# Patient Record
Sex: MALE
[De-identification: names, ages, dates, MRNs, and addresses within clinical notes are randomized per-mention and may not be internally consistent; named-entity substitution may affect disease eponyms.]

---

## 2018-06-26 ENCOUNTER — HOSPITAL ENCOUNTER (EMERGENCY)
Dept: HOSPITAL 90 - EDH | Age: 38
Discharge: HOME | End: 2018-06-26
Payer: MEDICARE

## 2018-06-26 DIAGNOSIS — S09.8XXA: Primary | ICD-10-CM
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
## 2018-06-26 DIAGNOSIS — I50.9: ICD-10-CM

## 2018-06-26 DIAGNOSIS — Z98.84: ICD-10-CM
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
## 2018-06-26 PROCEDURE — 70450 CT HEAD/BRAIN W/O DYE: CPT

## 2025-02-19 ENCOUNTER — HOSPITAL ENCOUNTER (EMERGENCY)
Dept: HOSPITAL 90 - EDH | Age: 45
Discharge: HOME | End: 2025-02-19
Payer: MEDICARE

## 2025-02-19 VITALS — BODY MASS INDEX: 44.04 KG/M2 | WEIGHT: 274.03 LBS | HEIGHT: 66 IN

## 2025-02-19 VITALS
DIASTOLIC BLOOD PRESSURE: 79 MMHG | RESPIRATION RATE: 20 BRPM | SYSTOLIC BLOOD PRESSURE: 131 MMHG | HEART RATE: 80 BPM | OXYGEN SATURATION: 99 % | TEMPERATURE: 97.9 F

## 2025-02-19 DIAGNOSIS — Z79.02: ICD-10-CM

## 2025-02-19 DIAGNOSIS — Z79.899: ICD-10-CM

## 2025-02-19 DIAGNOSIS — E11.9: ICD-10-CM

## 2025-02-19 DIAGNOSIS — Z79.84: ICD-10-CM

## 2025-02-19 DIAGNOSIS — E78.00: ICD-10-CM

## 2025-02-19 DIAGNOSIS — Z88.0: ICD-10-CM

## 2025-02-19 DIAGNOSIS — I11.0: ICD-10-CM

## 2025-02-19 DIAGNOSIS — Z20.822: ICD-10-CM

## 2025-02-19 DIAGNOSIS — Z79.82: ICD-10-CM

## 2025-02-19 DIAGNOSIS — R50.9: Primary | ICD-10-CM

## 2025-02-19 DIAGNOSIS — E87.5: ICD-10-CM

## 2025-02-19 DIAGNOSIS — E66.01: ICD-10-CM

## 2025-02-19 DIAGNOSIS — I50.9: ICD-10-CM

## 2025-02-19 LAB
APPEARANCE UR: CLEAR
BACTERIA URNS QL MICRO: (no result) /HPF
BASOPHILS # BLD AUTO: 0.04 K/UL (ref 0–0.2)
BASOPHILS NFR BLD AUTO: 0.4 % (ref 0–5)
BILIRUB UR QL STRIP: NEGATIVE MG/DL
BUN SERPL-MCNC: 32 MG/DL (ref 7–18)
CHLORIDE SERPL-SCNC: 104 MMOL/L (ref 101–111)
CO2 SERPL-SCNC: 26 MMOL/L (ref 21–32)
COLOR UR: (no result)
CREAT SERPL-MCNC: 2.2 MG/DL (ref 0.5–1.3)
DEPRECATED SQUAMOUS URNS QL MICRO: (no result) /HPF (ref 0–2)
EOSINOPHIL # BLD AUTO: 0.03 K/UL (ref 0–0.7)
EOSINOPHIL NFR BLD AUTO: 0.3 % (ref 0–8)
ERYTHROCYTE [DISTWIDTH] IN BLOOD BY AUTOMATED COUNT: 13.4 % (ref 11–15.5)
GFR SERPL CREATININE-BSD FRML MDRD: 36 ML/MIN (ref 90–?)
GLUCOSE SERPL-MCNC: 132 MG/DL (ref 70–105)
GLUCOSE UR STRIP-MCNC: >=1000 MG/DL
HCT VFR BLD AUTO: 49.9 % (ref 42–54)
HGB UR QL STRIP: (no result)
HYALINE CASTS URNS QL MICRO: (no result) /LPF
IMM GRANULOCYTES # BLD: 0.03 K/UL (ref 0–1)
KETONES UR STRIP-MCNC: NEGATIVE MG/DL
LEUKOCYTE ESTERASE UR QL STRIP: NEGATIVE LEU/UL
LYMPHOCYTES # SPEC AUTO: 1.3 K/UL (ref 1–4.8)
LYMPHOCYTES NFR SPEC AUTO: 12.1 % (ref 21–51)
MCH RBC QN AUTO: 26 PG (ref 27–33)
MCHC RBC AUTO-ENTMCNC: 30.3 G/DL (ref 32–36)
MCV RBC AUTO: 86 FL (ref 79–99)
MICRO URNS: YES
MONOCYTES # BLD AUTO: 0.8 K/UL (ref 0.1–1)
MONOCYTES NFR BLD AUTO: 7 % (ref 3–13)
MUCOUS THREADS URNS QL MICRO: (no result) LPF
NEUTROPHILS # BLD AUTO: 8.6 K/UL (ref 1.8–7.7)
NEUTROPHILS NFR BLD AUTO: 79.9 % (ref 40–77)
NITRITE UR QL STRIP: NEGATIVE
NRBC BLD MANUAL-RTO: 0 % (ref 0–0.19)
PH UR STRIP: 6 [PH] (ref 5–8)
PLATELET # BLD AUTO: 217 K/UL (ref 130–400)
POTASSIUM SERPL-SCNC: 5.3 MMOL/L (ref 3.5–5.1)
PROT UR QL STRIP: 300 MG/DL
RBC # BLD AUTO: 5.8 MIL/UL (ref 4.5–6.2)
RBC #/AREA URNS HPF: (no result) /HPF (ref 0–1)
RBC MORPH BLD: (no result)
SARS-COV-2 RNA SPEC QL NAA+PROBE: (no result)
SODIUM SERPL-SCNC: 137 MMOL/L (ref 136–145)
SP GR UR STRIP: 1.01 (ref 1–1.03)
UROBILINOGEN UR STRIP-MCNC: 0.2 MG/DL (ref 0.2–1)
WBC # BLD AUTO: 10.8 K/UL (ref 4.8–10.8)
WBC #/AREA URNS HPF: (no result) /HPF (ref 0–1)

## 2025-02-19 PROCEDURE — 87880 STREP A ASSAY W/OPTIC: CPT

## 2025-02-19 PROCEDURE — 80048 BASIC METABOLIC PNL TOTAL CA: CPT

## 2025-02-19 PROCEDURE — 93005 ELECTROCARDIOGRAM TRACING: CPT

## 2025-02-19 PROCEDURE — 99284 EMERGENCY DEPT VISIT MOD MDM: CPT

## 2025-02-19 PROCEDURE — 81001 URINALYSIS AUTO W/SCOPE: CPT

## 2025-02-19 PROCEDURE — 85025 COMPLETE CBC W/AUTO DIFF WBC: CPT

## 2025-02-19 PROCEDURE — 36415 COLL VENOUS BLD VENIPUNCTURE: CPT

## 2025-02-19 PROCEDURE — 87635 SARS-COV-2 COVID-19 AMP PRB: CPT

## 2025-02-19 PROCEDURE — 87804 INFLUENZA ASSAY W/OPTIC: CPT

## 2025-02-19 RX ADMIN — SODIUM ZIRCONIUM CYCLOSILICATE ONE GM: 5 POWDER, FOR SUSPENSION ORAL at 13:05

## 2025-02-19 NOTE — ERN
General


Chief Complaint:  Fever


Stated Complaint:  FEVER


Time Seen by MD:  10:24


Time Seen by Midlevel:  10:24


Source:  patient





History of Present Illness


Initial Comments


The patient is a morbidly obese 44-year-old male with a past medical history of 

type 2 diabetes, congestive heart failure, hyperlipidemia, and hypertension 

presenting to the emergency department for evaluation of fever that has been 

intermittent in nature for the last two days.  Patient has been taking Tylenol 

and Motrin with relief.  He specifically denies any nasal congestion, cough, 

shortness of breath, chest pain, or any other symptoms at this time.  Denies any

sick contacts


Allergies:  


Coded Allergies:  


     Penicillins (Unverified  Allergy, Severe, 7/26/24)


Home Meds


Reported Medications


Nifedipine (Nifedipine ER) 30 Mg Tab.er.24, 30 MG PO BID


   7/30/24


Folic Acid/Vit B Complex and C (Nephro Vitamins Tablet) 0.8 Mg Tablet, 0.8 MG PO

DAILY, TAB


   7/30/24


Famotidine (Famotidine) 20 Mg Tablet, 20 MG PO BID, TAB


   7/30/24


Enoxaparin Sodium (Enoxaparin Sodium) 40 Mg/0.4 Ml Disp.syrin, 40 MG SQ DAILY, 

DIS.SYR


   7/30/24


Clopidogrel Bisulfate (Plavix) 75 Mg Tablet, 75 MG PO DAILY, TAB


   7/30/24


Atorvastatin Calcium (LIPITOR) 40 Mg Tablet, 40 MG PO HS, TAB


   7/30/24


Cholecalciferol (Vitamin D3) (D3-50) 1,250 Mcg (04714 Unit) Capsule, 77121 UNIT 

PO AD, CAP


   7/27/24


Aspirin (Aspirin) 81 Mg Tab.chew, 81 MG PO DAILY, TAB.CHEW


   7/27/24


Dapagliflozin Propanediol (Farxiga) 10 Mg Tablet, 10 MG PO DAILY, TAB


   7/27/24


Montelukast Sodium (Montelukast Sodium) 10 Mg Tablet, 10 MG PO HS, TAB


   7/27/24





Past Medical History


Past Medical History:  CHF, Diabetes-Type II, High Cholesterol, Hypertension, 

Lung Disease


Past Surgical History:  Other


Surgical History Other:  gall bladder removal





Social History


Social History:  Negative





ROS Dictation


CONSTITUTIONAL:  Negative except for HPI


HEAD/FACE:  Negative except for HPI


EENT:  Negative except for HPI


RESPIRATORY: Negative except for HPI


GASTROINTESTINAL/ABDOMINAL:   Negative except for HPI


GENITOURINARY: Negative except for HPI


MUSCULOSKELETAL: Negative except for HPI


INTEGUMENTARY:  Negative except for HPI


NEUROLOGICAL/PSYCH: Negative except for HPI


HEMATOLOGIC/LYMPHATIC:  Negative except for HPI





All Systems Negative, Except as noted above.





13 point review of systems assessed and all negative except for above.





Physical Exam


Physical Exam Dictation


Vital Signs reviewed 


General Appearance: Alert, oriented x 3, no acute distress, well developed, 

nourished. 


Head and Face: non-traumatic.


Eyes: PERRL, pink conjunctivas, eyelid no trauma, anterior chamber with arcus 

senilis. 


Ears: Pinnas intact and no signs of trauma or erythema ear canals clear and no 

discharge TM no erythema 


Nose: No discharge, no bleeding. 


Oropharynx: Mouth normal, tongue pink, 


pharynx clear,no erythema, tonsils no exudates, no abscesses noted,  mucous 

membrane moist 


Neck: Supple, non-tender, no thyromegaly, no masses, no JVD, no bruits 


Breast:Deferred 


Chest:No tenderness, no crepitus, no paradoxical movement, no retractions 


Lungs:Clear, well-ventilated, symmetric, no rales, no wheezing, no rhonchi, no 

stridor, good breath sounds bilaterally 


Heart: Regular rate, regular rhythm, no murmur, no gallops 


Vascular: no peripheral edema, 


Abdomen: Soft, positive bowel sounds, nondistended, no guarding, 


nontender, no rebound, no masses no hepatomegaly, no splenomegaly, no Garcia's 

sign, no hernias.


Rectal: Deferred


Genital: Deferred


Neurological: Normal speech,  motor function intact, sensory function intact 


Musculoskeletal: Neck nontender, full range of motion, back nontender, full 

range of motion,


Extremities: nontender, full range of motion 


Skin: Color pink, dry, no turgor, no rash, no lacerations, no abrasions, no 

contusions.


Lymphatic: Deferred





Results


Laboratory and Microbiology


Lab and Micro Result





Laboratory Tests








Test


 2/19/25


10:39 2/19/25


10:50 2/19/25


11:50


 


Influenza Type A Antigen


 Negative For


Type A 


 





 


Influenza Type B Antigen


 Negative For


Type B 


 





 


SARS-CoV-2, RNA, NAAT


 NEGATIVE SARS


CoV-2 


 





 


Group A Streptococcus Rapid


 negative


(NEGATIVE) 


 





 


White Blood Count


 


 10.8 K/uL


(4.8-10.8) 





 


Red Blood Count


 


 5.80 MIL/uL


(4.50-6.20) 





 


Hemoglobin


 


 15.1 g/dL


(14.0-18.0) 





 


Hematocrit


 


 49.9 % (42-54)


 





 


Mean Corpuscular Volume


 


 86.0 fL


(79-99) 





 


Mean Corpuscular Hemoglobin


 


 26.0 pg


(27.0-33.0)  L 





 


Mean Corpuscular Hemoglobin


Concent 


 30.3 g/dL


(32.0-36.0)  L 





 


Red Cell Distribution Width


 


 13.4 %


(11.0-15.5) 





 


Platelet Count


 


 217 K/uL


(130-400) 





 


Mean Platelet Volume


 


 10.6 fL


(7.5-10.5)  H 





 


Immature Granulocyte % (Auto)  0.3 % (0-1)   


 


Neutrophils (%) (Auto)


 


 79.9 %


(40.0-77.0)  H 





 


Lymphocytes (%) (Auto)


 


 12.1 %


(21.0-51.0)  L 





 


Monocytes (%) (Auto)


 


 7.0 %


(3.0-13.0) 





 


Eosinophils (%) (Auto)


 


 0.3 %


(0.0-8.0) 





 


Basophils (%) (Auto)


 


 0.4 %


(0.0-5.0) 





 


Neutrophils # (Auto)


 


 8.6 K/uL


(1.8-7.7)  H 





 


Lymphocytes # (Auto)


 


 1.3 K/uL


(1.0-4.8) 





 


Monocytes # (Auto)


 


 0.8 K/uL


(0.1-1.0) 





 


Eosinophils # (Auto)


 


 0.03 K/uL


(0.00-0.70) 





 


Basophils # (Auto)


 


 0.04 K/uL


(0.00-0.20) 





 


Absolute Immature Granulocyte


(auto 


 0.03 K/uL


(0-1) 





 


Nucleated Red Blood Cells


 


 0.0 %


(0.0-0.19) 





 


Red Blood Cell Morphology  See comments   


 


Sodium Level


 


 137 mmol/L


(136-145) 





 


Potassium Level


 


 5.3 mmol/L


(3.5-5.1)  H 





 


Chloride Level


 


 104 mmol/L


(101-111) 





 


Carbon Dioxide Level


 


 26 mmol/L


(21-32) 





 


Blood Urea Nitrogen


 


 32 mg/dL


(7-18)  H 





 


Creatinine


 


 2.2 mg/dL


(0.5-1.3)  H 





 


Glomerular Filtration Rate


Calc 


 36 mL/min


(>90) 





 


Random Glucose


 


 132 mg/dL


()  H 





 


Total Calcium


 


 9.1 mg/dL


(8.5-10.1) 





 


Urine Color


 


 


 LIGHT-YELLOW


(YELLOW)


 


Urine Appearance   CLEAR (CLEAR)  


 


Urine pH   6.0 (5.0-8.0)  


 


Urine Specific Gravity


 


 


 1.014


(1.001-1.031)


 


Urine Protein


 


 


 300 mg/dL


(NEGATIVE)  H


 


Urine Glucose (UA)


 


 


 >=1000 mg/dL


(NEGATIVE)  H


 


Urine Ketones


 


 


 NEGATIVE mg/dL


(NEGATIVE)


 


Urine Occult Blood


 


 


 +- (TRACE)


(NEGATIVE)  H


 


Urine Nitrate


 


 


 NEGATIVE


(NEGATIVE)


 


Urine Bilirubin


 


 


 NEGATIVE mg/dL


(NEGATIVE)


 


Urine Urobilinogen


 


 


 0.2 mg/dL


(0.2-1.0)


 


Urine Leukocyte Esterase


 


 


 NEGATIVE


Jayson/uL


 


Urine RBC


 


 


 0-1 /HPF (0-1)





 


Urine WBC


 


 


 2-5 /HPF (0-1)


H


 


Urine Squamous Epithelial


Cells 


 


 RARE /HPF


(0-2)


 


Urine Bacteria


 


 


 FEW /HPF (None


Seen)


 


Urine Hyaline Casts


 


 


 2-5 /LPF (0-1


/LPF)  H








Labs Reviewed?:  Yes





MDM


MDM: The patient is a morbidly obese 44-year-old male with a past medical h

istory of type 2 diabetes, congestive heart failure, hyperlipidemia, and 

hypertension presenting to the emergency department for evaluation of fever that

has been intermittent in nature for the last two days.  Patient has been taking 

Tylenol and Motrin with relief.  He specifically denies any nasal congestion, 

cough, shortness of breath, chest pain, or any other symptoms at this time.  

Denies any sick contacts.  On physical examination patient is in no acute 

distress.  Initial vital signs are stable.  Patient was afebrile and nontoxic 

appearing.  His CBC shows a normal white blood cell count of 10.8.  Platelet 

counts are normal at 217.  Chemistries reveal an elevated BUN at 32 and an 

elevated creatinine at 2.2 however when compared to previous kidney function 

patient was at his baseline.  His potassium is slightly elevated at 5.3.  The 

patient was given Lokelma in the emergency department.  Offered admission for 

observation with the patient declined and would like to be discharged home.  His

respiratory swabs are negative.  His urinalysis does not show any evidence of 

infection.  Patient is specifically denies any dysuria, hematuria, shortness of 

breath, cough, or any other symptoms at this time.  The patient will be 

discharged home.  The patient has been afebrile during his entire ER visit.  

There are no signs of infection at this time patient will need to follow up with

his primary care doctor outpatient.


Differential diagnosis:  Viral syndrome, upper respiratory infection, urinary 

tract infection





There are no social concerns with this patient.





Prescription drug management


Prescriptions will include:  None





Medical management and examination interpretation discussions were had by me 

with other qualified healthcare professionals as indicated for the patient's 

care.


ED Course





Orders








Procedure Category Date Status





  Time 


 


Cbc With Differential LAB 2/19/25 Complete





  10:35 


 


Basic Metabolic Panel LAB 2/19/25 Complete





  10:35 


 


Covid Rna Naat LAB 2/19/25 Complete





  10:35 


 


Influenza Type A & B, LAB 2/19/25 Complete





Rapid  10:35 


 


Rapid (Group A Strep) LAB 2/19/25 Complete





  10:35 


 


Urinalysis Profile LAB 2/19/25 Complete





  11:06 


 


12 Lead Ekg Tracing- EKG 2/19/25 Resulted





Technical  12:48 


 


Sodium Zirconium PHA 2/19/25 Complete





Cyclosilicate  13:30 








Current Medications








 Medications


  (Trade)  Dose


 Ordered  Sig/Debbie


 Route


 PRN Reason  Start Time


 Stop Time Status Last Admin


Dose Admin


 


 Sodium Zirconium


 Cyclosilicate


  (Lokelma)  5 gm  ONCE  ONCE


 PO


   2/19/25 13:30


 2/19/25 13:24 DC 2/19/25 13:05











Vital Signs








  Date Time  Temp Pulse Resp B/P (MAP) Pulse Ox O2 Delivery O2 Flow Rate FiO2


 


2/19/25 13:11 97.9 80 20 131/79 99 Room Air* 0 21


 


2/19/25 11:50 97.9 82 20 139/83 99 Room Air* 0 21


 


2/19/25 10:31 97.9 82 20 139/83 99 Room Air  











DX & DISP


Disposition:  Discharge


Departure


Impression:  


   Primary Impression:  Subjective fever


   Additional Impression:  Hyperkalemia


Condition:  Stable





Additional Instructions:  


Your blood work today shows no evidence of infection.  


Your kidney function appears to be at baseline.  There was a slight elevation in

your potassium level.  You were given medication in the emergency department to 

lower your potassium level.  


If you develop any chest pain, shortness of breath, or any other symptoms please

report to the ER for further evaluation.  


Please keep appointment with your primary care doctor as scheduled.  


Your EKG does not show any cardiac arrhythmia or evidence of a heart attack.


Referrals:  


SUZANNE SON (PCP)


I have reviewed the case, and I agree with, Diagnosis and Plan


I performed the substantive portion of the visit.  I have reviewed and 

personally made and approve the management plan that is documented in the note 

by myself or the REGLA. I acknowledge for responsibility for the patient's 

management plan.











BALBIR KATZ                Feb 19, 2025 11:07

## 2025-02-19 NOTE — EKG
[facility]

                                       

Test Date:    2025               Test Time:    12:48:53

Pat Name:     BERNABE REYES            Department:   [facility]

Patient ID:   [id number]           Room:          

Gender:       M                        Technician:   [id number]

:          1980               Requested By: BALBIR KATZ

Order Number: [id number]        Reading MD:   Chuy Marino

                                 Measurements

Intervals                              Axis          

Rate:         76                       P:            19

AK:           171                      QRS:          161

QRSD:         117                      T:            19

QT:           378                                    

QTc:          426                                    

                           Interpretive Statements

Sinus rhythm

Nonspecific intraventricular conduction delay

Consider anterior infarct

Compared to ECG 2024 08:23:06

Intraventricular conduction delay now present

Left posterior fascicular block no longer present

Incomplete right bundle-branch block no longer present

Myocardial infarct finding still present

Electronically Signed On 2025 17:45:16 CST by Chuy Marino



Please click the below link to view image of tracing.

## 2025-04-23 ENCOUNTER — HOSPITAL ENCOUNTER (OUTPATIENT)
Dept: HOSPITAL 90 - DAH | Age: 45
Discharge: HOME | End: 2025-04-23
Attending: HOSPITALIST
Payer: MEDICARE

## 2025-04-23 VITALS — RESPIRATION RATE: 18 BRPM | HEART RATE: 77 BPM

## 2025-04-23 VITALS
SYSTOLIC BLOOD PRESSURE: 151 MMHG | RESPIRATION RATE: 18 BRPM | DIASTOLIC BLOOD PRESSURE: 96 MMHG | HEART RATE: 75 BPM | TEMPERATURE: 97.5 F

## 2025-04-23 VITALS
SYSTOLIC BLOOD PRESSURE: 120 MMHG | RESPIRATION RATE: 15 BRPM | TEMPERATURE: 97.9 F | DIASTOLIC BLOOD PRESSURE: 75 MMHG | HEART RATE: 72 BPM

## 2025-04-23 VITALS — SYSTOLIC BLOOD PRESSURE: 136 MMHG | DIASTOLIC BLOOD PRESSURE: 71 MMHG | RESPIRATION RATE: 15 BRPM | HEART RATE: 79 BPM

## 2025-04-23 VITALS
RESPIRATION RATE: 18 BRPM | DIASTOLIC BLOOD PRESSURE: 78 MMHG | HEART RATE: 81 BPM | SYSTOLIC BLOOD PRESSURE: 133 MMHG | TEMPERATURE: 97.9 F

## 2025-04-23 VITALS
DIASTOLIC BLOOD PRESSURE: 79 MMHG | HEART RATE: 85 BPM | TEMPERATURE: 98 F | SYSTOLIC BLOOD PRESSURE: 133 MMHG | RESPIRATION RATE: 16 BRPM

## 2025-04-23 VITALS — DIASTOLIC BLOOD PRESSURE: 74 MMHG | RESPIRATION RATE: 16 BRPM | HEART RATE: 75 BPM | SYSTOLIC BLOOD PRESSURE: 129 MMHG

## 2025-04-23 VITALS — HEIGHT: 66 IN | BODY MASS INDEX: 43.39 KG/M2 | WEIGHT: 270 LBS

## 2025-04-23 VITALS — RESPIRATION RATE: 15 BRPM | DIASTOLIC BLOOD PRESSURE: 78 MMHG | SYSTOLIC BLOOD PRESSURE: 117 MMHG | HEART RATE: 79 BPM

## 2025-04-23 VITALS
RESPIRATION RATE: 18 BRPM | SYSTOLIC BLOOD PRESSURE: 124 MMHG | TEMPERATURE: 97.8 F | DIASTOLIC BLOOD PRESSURE: 75 MMHG | HEART RATE: 82 BPM

## 2025-04-23 VITALS — RESPIRATION RATE: 16 BRPM | HEART RATE: 85 BPM | DIASTOLIC BLOOD PRESSURE: 79 MMHG | SYSTOLIC BLOOD PRESSURE: 126 MMHG

## 2025-04-23 VITALS — DIASTOLIC BLOOD PRESSURE: 79 MMHG | SYSTOLIC BLOOD PRESSURE: 125 MMHG | HEART RATE: 74 BPM | RESPIRATION RATE: 16 BRPM

## 2025-04-23 DIAGNOSIS — Z88.0: ICD-10-CM

## 2025-04-23 DIAGNOSIS — R14.0: ICD-10-CM

## 2025-04-23 DIAGNOSIS — Z79.82: ICD-10-CM

## 2025-04-23 DIAGNOSIS — Z98.84: ICD-10-CM

## 2025-04-23 DIAGNOSIS — K22.89: ICD-10-CM

## 2025-04-23 DIAGNOSIS — K31.89: ICD-10-CM

## 2025-04-23 DIAGNOSIS — K29.70: ICD-10-CM

## 2025-04-23 DIAGNOSIS — Z90.89: ICD-10-CM

## 2025-04-23 DIAGNOSIS — G47.33: ICD-10-CM

## 2025-04-23 DIAGNOSIS — E78.5: ICD-10-CM

## 2025-04-23 DIAGNOSIS — Z90.49: ICD-10-CM

## 2025-04-23 DIAGNOSIS — Z79.899: ICD-10-CM

## 2025-04-23 DIAGNOSIS — J45.909: ICD-10-CM

## 2025-04-23 DIAGNOSIS — R10.13: Primary | ICD-10-CM

## 2025-04-23 DIAGNOSIS — I50.9: ICD-10-CM

## 2025-04-23 DIAGNOSIS — E66.01: ICD-10-CM

## 2025-04-23 DIAGNOSIS — I11.0: ICD-10-CM

## 2025-04-23 PROCEDURE — C1726 CATH, BAL DIL, NON-VASCULAR: HCPCS

## 2025-04-23 PROCEDURE — A4620 VARIABLE CONCENTRATION MASK: HCPCS

## 2025-04-23 PROCEDURE — A4221 SUPP NON-INSULIN INF CATH/WK: HCPCS

## 2025-04-23 PROCEDURE — 43239 EGD BIOPSY SINGLE/MULTIPLE: CPT

## 2025-04-23 PROCEDURE — A4606 OXYGEN PROBE USED W OXIMETER: HCPCS

## 2025-04-23 PROCEDURE — A4222 INFUSION SUPPLIES WITH PUMP: HCPCS

## 2025-04-23 PROCEDURE — 43245 EGD DILATE STRICTURE: CPT

## 2025-04-23 PROCEDURE — A4663 DIALYSIS BLOOD PRESSURE CUFF: HCPCS

## 2025-04-23 PROCEDURE — 94640 AIRWAY INHALATION TREATMENT: CPT

## 2025-04-23 PROCEDURE — A4215 STERILE NEEDLE: HCPCS

## 2025-04-23 PROCEDURE — A7002 TUBING USED W SUCTION PUMP: HCPCS

## 2025-04-23 PROCEDURE — 82948 REAGENT STRIP/BLOOD GLUCOSE: CPT

## 2025-04-23 PROCEDURE — A4223 INFUSION SUPPLIES W/O PUMP: HCPCS

## 2025-04-23 RX ADMIN — ALBUTEROL SULFATE ONE MG: 2.5 SOLUTION RESPIRATORY (INHALATION) at 08:33
